# Patient Record
Sex: MALE | Race: WHITE | NOT HISPANIC OR LATINO | URBAN - NONMETROPOLITAN AREA
[De-identification: names, ages, dates, MRNs, and addresses within clinical notes are randomized per-mention and may not be internally consistent; named-entity substitution may affect disease eponyms.]

---

## 2021-01-01 ENCOUNTER — APPOINTMENT (OUTPATIENT)
Dept: URBAN - NONMETROPOLITAN AREA CLINIC 45 | Age: 56
Setting detail: DERMATOLOGY
End: 2021-01-01

## 2021-01-01 DIAGNOSIS — B07.8 OTHER VIRAL WARTS: ICD-10-CM

## 2021-01-01 DIAGNOSIS — L82.0 INFLAMED SEBORRHEIC KERATOSIS: ICD-10-CM

## 2021-01-01 DIAGNOSIS — L57.8 OTHER SKIN CHANGES DUE TO CHRONIC EXPOSURE TO NONIONIZING RADIATION: ICD-10-CM

## 2021-01-01 DIAGNOSIS — L82.1 OTHER SEBORRHEIC KERATOSIS: ICD-10-CM

## 2021-01-01 DIAGNOSIS — T07XXXA ABRASION OR FRICTION BURN OF OTHER, MULTIPLE, AND UNSPECIFIED SITES, WITHOUT MENTION OF INFECTION: ICD-10-CM

## 2021-01-01 DIAGNOSIS — B35.3 TINEA PEDIS: ICD-10-CM

## 2021-01-01 PROCEDURE — OTHER LIQUID NITROGEN: OTHER

## 2021-01-01 PROCEDURE — 99203 OFFICE O/P NEW LOW 30 MIN: CPT | Mod: 25

## 2021-01-01 PROCEDURE — 17110 DESTRUCT B9 LESION 1-14: CPT

## 2021-01-01 PROCEDURE — OTHER REASSURANCE: OTHER

## 2021-01-01 PROCEDURE — OTHER PRESCRIPTION: OTHER

## 2021-01-01 PROCEDURE — OTHER MIPS QUALITY: OTHER

## 2021-01-01 PROCEDURE — OTHER COUNSELING: OTHER

## 2021-01-01 PROCEDURE — OTHER TREATMENT REGIMEN: OTHER

## 2021-01-01 RX ORDER — MUPIROCIN 20 MG/G
APPLY OINTMENT TOPICAL
Qty: 1 | Refills: 2 | Status: ERX | COMMUNITY
Start: 2021-01-01

## 2021-01-01 RX ORDER — CICLOPIROX 7.7 MG/G
APPLY GEL TOPICAL TWICE A DAY
Qty: 1 | Refills: 4 | Status: ERX | COMMUNITY
Start: 2021-01-01

## 2021-01-01 ASSESSMENT — LOCATION SIMPLE DESCRIPTION DERM
LOCATION SIMPLE: LEFT CHEEK
LOCATION SIMPLE: RIGHT TEMPLE
LOCATION SIMPLE: CHEST
LOCATION SIMPLE: PLANTAR SURFACE OF RIGHT 3RD TOE
LOCATION SIMPLE: LEFT FOREARM
LOCATION SIMPLE: LEFT TEMPLE

## 2021-01-01 ASSESSMENT — LOCATION ZONE DERM
LOCATION ZONE: FACE
LOCATION ZONE: TRUNK
LOCATION ZONE: TOE
LOCATION ZONE: ARM

## 2021-01-01 ASSESSMENT — LOCATION DETAILED DESCRIPTION DERM
LOCATION DETAILED: LEFT PROXIMAL DORSAL FOREARM
LOCATION DETAILED: LEFT LATERAL TEMPLE
LOCATION DETAILED: LEFT INFERIOR CENTRAL MALAR CHEEK
LOCATION DETAILED: LEFT MEDIAL MALAR CHEEK
LOCATION DETAILED: RIGHT LATERAL PLANTAR 3RD TOE
LOCATION DETAILED: RIGHT LATERAL TEMPLE
LOCATION DETAILED: LEFT MEDIAL INFERIOR CHEST

## 2021-03-11 PROBLEM — L57.8 OTHER SKIN CHANGES DUE TO CHRONIC EXPOSURE TO NONIONIZING RADIATION: Status: ACTIVE | Noted: 2021-01-01

## 2021-03-11 PROBLEM — L82.1 OTHER SEBORRHEIC KERATOSIS: Status: ACTIVE | Noted: 2021-01-01

## 2021-03-11 PROBLEM — L82.0 INFLAMED SEBORRHEIC KERATOSIS: Status: ACTIVE | Noted: 2021-01-01

## 2021-03-11 PROBLEM — S50.812A ABRASION OF LEFT FOREARM, INITIAL ENCOUNTER: Status: ACTIVE | Noted: 2021-01-01

## 2021-03-11 PROBLEM — B07.8 OTHER VIRAL WARTS: Status: ACTIVE | Noted: 2021-01-01

## 2021-03-11 PROBLEM — B35.3 TINEA PEDIS: Status: ACTIVE | Noted: 2021-01-01

## 2021-03-11 NOTE — PROCEDURE: LIQUID NITROGEN
Render Note In Bullet Format When Appropriate: No
Medical Necessity Clause: This procedure was medically necessary because the lesions that were treated were:
Render Post-Care Instructions In Note?: yes
Consent: The patient's consent was obtained including but not limited to risks of crusting, scabbing, blistering, scarring, darker or lighter pigmentary change, recurrence, incomplete removal and infection.
Post-Care Instructions: I reviewed with the patient in detail post-care instructions. Patient is to wear sunprotection, and avoid picking at any of the treated lesions. Pt may apply Vaseline to crusted or scabbing areas.
Detail Level: Detailed
Medical Necessity Information: It is in your best interest to select a reason for this procedure from the list below. All of these items fulfill various CMS LCD requirements except the new and changing color options.

## 2021-03-11 NOTE — PROCEDURE: TREATMENT REGIMEN
Detail Level: Zone
Plan: Mupirocin ointment twice a day as needed for wounds
Plan: Ciclopirox gel twice a day as needed

## 2021-09-17 ENCOUNTER — OFFICE VISIT (OUTPATIENT)
Dept: URGENT CARE | Facility: URGENT CARE | Age: 56
End: 2021-09-17
Payer: COMMERCIAL

## 2021-09-17 VITALS
TEMPERATURE: 100.8 F | HEART RATE: 97 BPM | RESPIRATION RATE: 16 BRPM | SYSTOLIC BLOOD PRESSURE: 144 MMHG | DIASTOLIC BLOOD PRESSURE: 81 MMHG | HEIGHT: 74 IN | WEIGHT: 223.5 LBS | OXYGEN SATURATION: 94 % | BODY MASS INDEX: 28.68 KG/M2

## 2021-09-17 DIAGNOSIS — R05.9 COUGH: ICD-10-CM

## 2021-09-17 DIAGNOSIS — U07.1 COVID-19: Primary | ICD-10-CM

## 2021-09-17 LAB — SARS-COV-2 RDRP RESP QL NAA+PROBE: POSITIVE

## 2021-09-17 PROCEDURE — C9803 HOPD COVID-19 SPEC COLLECT: HCPCS | Mod: NCP | Performed by: PHYSICIAN ASSISTANT

## 2021-09-17 PROCEDURE — 99203 OFFICE O/P NEW LOW 30 MIN: CPT | Mod: CS | Performed by: PHYSICIAN ASSISTANT

## 2021-09-17 PROCEDURE — 87635 SARS-COV-2 COVID-19 AMP PRB: CPT | Performed by: PHYSICIAN ASSISTANT

## 2021-09-17 RX ORDER — METOPROLOL TARTRATE 50 MG/1
TABLET ORAL EVERY 12 HOURS
COMMUNITY

## 2021-09-17 RX ORDER — ERGOCALCIFEROL 1.25 MG/1
CAPSULE ORAL
COMMUNITY

## 2021-09-17 RX ORDER — FERROUS GLUCONATE 324(38)MG
3 TABLET ORAL
COMMUNITY

## 2021-09-17 RX ORDER — SODIUM BICARBONATE 650 MG/1
2600 TABLET ORAL
COMMUNITY

## 2021-09-17 RX ORDER — ASPIRIN 81 MG/1
81 TABLET ORAL DAILY
COMMUNITY

## 2021-09-17 RX ORDER — FEBUXOSTAT 40 MG/1
TABLET, FILM COATED ORAL EVERY 24 HOURS
COMMUNITY

## 2021-09-17 ASSESSMENT — PAIN SCALES - GENERAL: PAINLEVEL: 4

## 2021-09-18 NOTE — PATIENT INSTRUCTIONS
Take 2 Mucinex extended release once in the morning and again in the evening. Drink plenty of water    Continue with tylenol for fever and pain, with nyquil, dayquil and sudafed as you have been.    If on your travels home you have significant worsening of symptoms or short of breath please seek care at either an urgent care or emergency room.     Upon return to home contact your PCP for infusion therapy for monoclonal antibodies (antiviral infusion)

## 2021-09-18 NOTE — PROGRESS NOTES
"Nicole Darnell is a 55 y.o. male who presents for Sore Throat (P[place]et complains of scratchy throat, head/chest/nasal congestion, headache, cough, started 3 days ago. Tried sudafed, nyquil, with minimal relief. )  .      HPI    Patient presents to the Urgent Care clinic today with complaints of sore throat.       Patient presents today complaining of scratchy throat, head and chest congestion along with significant nasal congestion and headache.  Patient also reports mild cough all symptoms started approximately 3 days ago.  Is also complaining of a mild dry cough.    Patient states that he traveled here with his family from [place] and reports his son who is traveling with his had similar symptoms that started last weekend.    Patient is used Sudafed, NyQuil with some relief.    Patient has not been vaccinated for COVID-19.    Patient denies shortness of breath, abdominal pain, nausea, vomiting, diarrhea.  Patient denies chest pain, dizziness.    The following have been reviewed and updated as appropriate in this visit:                 Current Outpatient Medications   Medication Sig Dispense Refill   • ferrous gluconate (FERGON) 324 mg (38 mg iron) tablet Take 3 tablets by mouth     • febuxostat (Uloric) 40 mg tablet daily     • sodium bicarbonate 650 mg tablet Take 2,600 mg by mouth     • ergocalciferol (VITAMIN D2) 1,250 mcg (50,000 unit) capsule 1 capsule     • metoprolol tartrate (LOPRESSOR) 50 mg tablet every 12 hours     • aspirin 81 mg EC tablet Take 81 mg by mouth daily       No current facility-administered medications for this visit.         Review of Systems  See HPI    Objective     Vitals:  /81 (BP Location: Left arm, Patient Position: Sitting, Cuff Size: Long Adult)   Pulse 97   Temp (!) 38.2 °C (100.8 °F) (Temporal)   Resp 16   Ht 1.88 m (6' 2\")   Wt 101.4 kg (223 lb 8 oz)   SpO2 94%   BMI 28.70 kg/m²       Physical Exam  Constitutional:       General: He is not in acute " distress.     Appearance: Normal appearance. He is normal weight. He is ill-appearing. He is not toxic-appearing or diaphoretic.   HENT:      Head: Normocephalic and atraumatic.      Right Ear: Tympanic membrane normal.      Left Ear: Tympanic membrane normal.      Nose: Congestion and rhinorrhea present.      Mouth/Throat:      Pharynx: No posterior oropharyngeal erythema.   Eyes:      General:         Right eye: No discharge.         Left eye: No discharge.      Conjunctiva/sclera: Conjunctivae normal.   Cardiovascular:      Rate and Rhythm: Normal rate and regular rhythm.      Pulses: Normal pulses.      Heart sounds: Normal heart sounds.   Pulmonary:      Effort: Pulmonary effort is normal.      Breath sounds: Normal breath sounds.   Musculoskeletal:      Cervical back: Neck supple.   Lymphadenopathy:      Cervical: No cervical adenopathy.   Skin:     General: Skin is warm and dry.   Neurological:      General: No focal deficit present.      Mental Status: He is alert and oriented to person, place, and time.           Recent Results (from the past 24 hour(s))   COVID-19 PCR    Collection Time: 09/17/21  6:26 PM    Specimen: Nasopharynx; Swab   Result Value Ref Range    COVID-19 PCR Positive (A) Negative         Assessment/Plan     Diagnoses and all orders for this visit:    Cough  -     COVID-19 PCR            MDM    • Findings are consistent with acute URI without complication  • Patient is Covid positive  • Had a discussion with patient regarding staying in [place] and receiving infusion therapy versus returning home to [place] immediately.  Based on his stability I do feel that he may travel to [place] if he wishes.  He did state that he may fall outside of his window if he returns to his home state 10th day of onset.  I did advise that if he takes a significant change that it would benefit him to remain in the [facility] and seek infusion therapy.  He is febrile but SPO2 is at 9596% room air  breathing is nonlabored and he is normotensive.      Discussion/Plan    Take 2 Mucinex extended release once in the morning and again in the evening. Drink plenty of water    Continue with tylenol for fever and pain, with nyquil, dayquil and sudafed as you have been.    If on your travels home you have significant worsening of symptoms or short of breath please seek care at either an urgent care or emergency room.     Upon return to home contact your PCP for infusion therapy for monoclonal antibodies (antiviral infusion)    No follow-ups on file.    The patient verbalizes agreement and understanding the plan    KIRK Bingham         A voice recognition program was used to aid in documentation of this record. Sometimes words are not printed exactly as they were spoken.  While efforts were made to carefully edit and correct any inaccuracies, some areas may be present; please take these into context.  Please contact the provider if areas are identified.